# Patient Record
Sex: FEMALE | Race: WHITE | URBAN - NONMETROPOLITAN AREA
[De-identification: names, ages, dates, MRNs, and addresses within clinical notes are randomized per-mention and may not be internally consistent; named-entity substitution may affect disease eponyms.]

---

## 2020-05-15 ENCOUNTER — IMPORTED ENCOUNTER (OUTPATIENT)
Dept: URBAN - NONMETROPOLITAN AREA CLINIC 1 | Facility: CLINIC | Age: 70
End: 2020-05-15

## 2020-05-15 PROBLEM — H52.223: Noted: 2020-05-15

## 2020-05-15 PROBLEM — H25.13: Noted: 2020-05-15

## 2020-05-15 PROBLEM — H52.03: Noted: 2020-05-15

## 2020-05-15 PROBLEM — H52.4: Noted: 2020-05-15

## 2020-05-15 PROCEDURE — 92004 COMPRE OPH EXAM NEW PT 1/>: CPT

## 2020-05-15 PROCEDURE — 92015 DETERMINE REFRACTIVE STATE: CPT

## 2020-05-15 NOTE — PATIENT DISCUSSION
Mixed Astigmatism OU w/Presbyopia-  discussed findings w/patient-  new spectacle Rx issued-  continue to monitor yearly or prnCataracts OU-  discussed findings w/patient-  no treatment indicated at this time-  UV protection recommended-  continue to monitor yealry or prn; 's Notes: MR 5/15/2020DFE 5/15/2020

## 2020-06-11 ENCOUNTER — IMPORTED ENCOUNTER (OUTPATIENT)
Dept: URBAN - NONMETROPOLITAN AREA CLINIC 1 | Facility: CLINIC | Age: 70
End: 2020-06-11

## 2020-06-11 PROBLEM — H52.4: Noted: 2020-06-11

## 2020-06-11 PROBLEM — H52.03: Noted: 2020-06-11

## 2020-06-11 PROBLEM — H16.143: Noted: 2020-06-11

## 2020-06-11 PROBLEM — H52.223: Noted: 2020-06-11

## 2020-06-11 PROBLEM — S05.01XA: Noted: 2020-06-11

## 2020-06-11 PROBLEM — H16.223: Noted: 2020-06-11

## 2020-06-11 PROCEDURE — 92012 INTRM OPH EXAM EST PATIENT: CPT

## 2020-06-11 NOTE — PATIENT DISCUSSION
Corneal Abrasion OD/Keratitis OU-  discussed findings w/patient-  start Tobradex QID OU x 1 week-  RTC 1 week or prnDES OU-  discussed findings w/patient-  after treating the other issues will start treatment-  continue to monitor 1 week or prn; 's Notes: MR 5/15/2020DFE 5/15/2020

## 2020-06-18 ENCOUNTER — IMPORTED ENCOUNTER (OUTPATIENT)
Dept: URBAN - NONMETROPOLITAN AREA CLINIC 1 | Facility: CLINIC | Age: 70
End: 2020-06-18

## 2020-06-18 PROBLEM — H52.03: Noted: 2020-06-18

## 2020-06-18 PROBLEM — H52.223: Noted: 2020-06-18

## 2020-06-18 PROBLEM — H52.4: Noted: 2020-06-18

## 2020-06-18 PROBLEM — H16.223: Noted: 2020-06-18

## 2020-06-18 PROCEDURE — 99213 OFFICE O/P EST LOW 20 MIN: CPT

## 2020-06-18 NOTE — PATIENT DISCUSSION
Corneal Abrasion OD/Keratitis OU resolved-  discussed findings w/patient-  start Refresh Cedric 3's at least TID-QID OU -  RTC as scheduled or prn JENNIFER OU-  discussed findings w/patient-  start Refresh Cedric 3's at least TID-QID OU -  start Hot compresses at least QD for 10 minutes-  continue to monitor 3 month f/u or prn Cataracts OU -  discussed findings w/patient-  will recheck in 3 monthd w/BAT; 's Notes: MR 5/15/2020DFE 5/15/2020

## 2020-09-18 ENCOUNTER — IMPORTED ENCOUNTER (OUTPATIENT)
Dept: URBAN - NONMETROPOLITAN AREA CLINIC 1 | Facility: CLINIC | Age: 70
End: 2020-09-18

## 2020-09-18 PROBLEM — H52.03: Noted: 2020-09-18

## 2020-09-18 PROBLEM — H52.4: Noted: 2020-09-18

## 2020-09-18 PROBLEM — H16.223: Noted: 2020-06-18

## 2020-09-18 PROBLEM — H25.13: Noted: 2020-09-18

## 2020-09-18 PROBLEM — H52.223: Noted: 2020-09-18

## 2020-09-18 PROCEDURE — 99213 OFFICE O/P EST LOW 20 MIN: CPT

## 2020-09-18 NOTE — PATIENT DISCUSSION
JENNIFER OU-  discussed findings w/patient-  continue Refresh Cedric 3's at least TID-QID OU -  continue Hot compresses at least QD for 10 minutes-  continue to monitor 3 month f/u or prn Cataracts OU -  discussed findings w/patient-  BAT 20/40 OD & OS-  no changes noted at this time-  UV protection recommended-  RTC May 2021 Complete or prn; 's Notes: MR 5/15/2020DFE 5/15/2020

## 2021-06-08 ENCOUNTER — IMPORTED ENCOUNTER (OUTPATIENT)
Dept: URBAN - NONMETROPOLITAN AREA CLINIC 1 | Facility: CLINIC | Age: 71
End: 2021-06-08

## 2021-06-08 PROBLEM — H52.223: Noted: 2021-06-08

## 2021-06-08 PROBLEM — H52.03: Noted: 2021-06-08

## 2021-06-08 PROBLEM — H16.223: Noted: 2021-06-08

## 2021-06-08 PROBLEM — H52.4: Noted: 2021-06-08

## 2021-06-08 PROBLEM — H25.13: Noted: 2021-06-08

## 2021-06-08 PROCEDURE — 92014 COMPRE OPH EXAM EST PT 1/>: CPT

## 2021-06-08 PROCEDURE — 92015 DETERMINE REFRACTIVE STATE: CPT

## 2022-04-10 ASSESSMENT — TONOMETRY
OS_IOP_MMHG: 20
OD_IOP_MMHG: 18
OD_IOP_MMHG: 22
OS_IOP_MMHG: 18
OD_IOP_MMHG: 19
OD_IOP_MMHG: 18
OS_IOP_MMHG: 16
OD_IOP_MMHG: 15
OS_IOP_MMHG: 19
OS_IOP_MMHG: 19

## 2022-04-10 ASSESSMENT — VISUAL ACUITY
OS_GLARE: 20/40
OD_SC: 20/25
OS_SC: 20/25-1 BLURRY
OD_SC: 20/25-1
OD_SC: 20/25-2
OS_SC: 20/25-2
OD_SC: 20/20-2
OS_SC: 20/30+2
OD_SC: 20/25-2
OU_SC: 20/25-1
OU_CC: 20/20-2
OS_SC: 20/25
OS_SC: 20/25-2
OD_GLARE: 20/40

## 2022-07-20 NOTE — PATIENT DISCUSSION
Mixed Astigmatism OU w/Presbyopia-  discussed findings w/patient-  new spectacle Rx issued-  continue to monitor yearly or prnDES OU-  discussed findings w/patient-  continue Refresh Cedric 3's at least TID-QID OU -  continue Hot compresses QD OU-  continue to monitor yearly or prnCataracts OU -  discussed findings w/patient-  no changes noted at this time-  UV protection recommended-  RTC 1 year or prn; 's Notes: MR 6/8/2021DFE 6/8/2021.

## 2022-07-20 NOTE — PATIENT DISCUSSION
Cataracts are significant at this time patient defers Cat Sx at this time. Observe for now without intervention. The patient was advised to contact office with any change or worsening of vision.